# Patient Record
Sex: MALE | Race: WHITE | NOT HISPANIC OR LATINO | Employment: FULL TIME | URBAN - METROPOLITAN AREA
[De-identification: names, ages, dates, MRNs, and addresses within clinical notes are randomized per-mention and may not be internally consistent; named-entity substitution may affect disease eponyms.]

---

## 2021-01-26 ENCOUNTER — SOCIAL WORK (OUTPATIENT)
Dept: BEHAVIORAL/MENTAL HEALTH CLINIC | Facility: CLINIC | Age: 56
End: 2021-01-26
Payer: COMMERCIAL

## 2021-01-26 DIAGNOSIS — F41.1 GENERALIZED ANXIETY DISORDER: Primary | ICD-10-CM

## 2021-01-26 PROCEDURE — 90791 PSYCH DIAGNOSTIC EVALUATION: CPT
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
## 2021-01-26 NOTE — BH TREATMENT PLAN
Sol Weldon  1965       Date of Initial Treatment Plan: 1/26/2021   Date of Current Treatment Plan: 01/26/21    Treatment Plan Number 1     Strengths/Personal Resources for Self Care: honest and willing to help out when needed    Diagnosis:   1  Generalized anxiety disorder         Area of Needs: too trusting      Long Term Goal 1: Jeff improve self esteem too trusting and improve communication in marriage    Target Date: N/A  Completion Date: N/A         Short Term Objectives for Goal 1: Aidentify triggers that preceed an argument, Bto be more understanding of wifes past trauma and  remain aware of behaviors when interacting with others  Long Term Goal 2: N/A    Target Date: N/A  Completion Date: N/A    Short Term Objectives for Goal 2: N/A         Long Term Goal # 3: N/A     Target Date: N/A  Completion Date: N/A    Short Term Objectives for Goal 3: N/A    GOAL 1: Modality: 2 times a month completed by 6/26/2021    Behavioral Health Treatment Plan [facility]: Diagnosis and Treatment Plan explained to Howie Sanchez relates understanding diagnosis and is agreeable to Treatment Plan         Client Comments : Please share your thoughts, feelings, need and/or experiences regarding your treatment plan: ct agreed

## 2021-02-24 ENCOUNTER — SOCIAL WORK (OUTPATIENT)
Dept: BEHAVIORAL/MENTAL HEALTH CLINIC | Facility: CLINIC | Age: 56
End: 2021-02-24
Payer: COMMERCIAL

## 2021-02-24 DIAGNOSIS — F41.1 GENERALIZED ANXIETY DISORDER: Primary | ICD-10-CM

## 2021-02-24 PROCEDURE — 90834 PSYTX W PT 45 MINUTES: CPT
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
## 2021-03-17 ENCOUNTER — TELEMEDICINE (OUTPATIENT)
Dept: BEHAVIORAL/MENTAL HEALTH CLINIC | Facility: CLINIC | Age: 56
End: 2021-03-17
Payer: COMMERCIAL

## 2021-03-17 DIAGNOSIS — F41.1 GENERALIZED ANXIETY DISORDER: Primary | ICD-10-CM

## 2021-03-17 PROCEDURE — 90834 PSYTX W PT 45 MINUTES: CPT

## 2021-03-31 ENCOUNTER — SOCIAL WORK (OUTPATIENT)
Dept: BEHAVIORAL/MENTAL HEALTH CLINIC | Facility: CLINIC | Age: 56
End: 2021-03-31
Payer: COMMERCIAL

## 2021-03-31 DIAGNOSIS — F41.1 GENERALIZED ANXIETY DISORDER: Primary | ICD-10-CM

## 2021-03-31 PROCEDURE — 90834 PSYTX W PT 45 MINUTES: CPT

## 2021-03-31 NOTE — PSYCH
Psychotherapy Provided: Individual Psychotherapy 45 minutes     Length of time in session: 45 minutes, follow up in 2 week    Goals addressed in session: Goal 1     Pain:      none    0    Current suicide risk : Low     D- ct shared that he has had some good talks with his son and his son is still apprehensive about sharing with him what's is going on in its entirety  Ct reports that his communication with wife run hot and cold  Ct feels that she over reacts to and turns things around in arguments  Ct reports that his shift changed at work and he is still getting used to his new work schedule  Ct uses FMLA when needed  A- ct presented with mild anxiety  Ct is worried about son and ct was given information on counseling for son  Ct was verbal and engaged in session  Ct insight and judgement is fair  P- ct will attend session and use techniques to manage anxiety and improve communication  Behavioral Health Treatment Plan [facility]: Diagnosis and Treatment Plan explained to Ayo Rosales relates understanding diagnosis and is agreeable to Treatment Plan   Yes

## 2021-04-14 ENCOUNTER — SOCIAL WORK (OUTPATIENT)
Dept: BEHAVIORAL/MENTAL HEALTH CLINIC | Facility: CLINIC | Age: 56
End: 2021-04-14
Payer: COMMERCIAL

## 2021-04-14 DIAGNOSIS — F41.1 GENERALIZED ANXIETY DISORDER: Primary | ICD-10-CM

## 2021-04-14 PROCEDURE — 90834 PSYTX W PT 45 MINUTES: CPT
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
## 2021-04-28 ENCOUNTER — SOCIAL WORK (OUTPATIENT)
Dept: BEHAVIORAL/MENTAL HEALTH CLINIC | Facility: CLINIC | Age: 56
End: 2021-04-28
Payer: COMMERCIAL

## 2021-04-28 DIAGNOSIS — F41.1 GENERALIZED ANXIETY DISORDER: Primary | ICD-10-CM

## 2021-04-28 PROCEDURE — 90834 PSYTX W PT 45 MINUTES: CPT

## 2021-04-28 NOTE — PSYCH
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  He had a complaint filed against by a customer  Ct feels it has been resolved  Ct reports that he and wife are having ups and down and ct cannot predict when wife will start an argument  Ct and wife try to be intimate every nite and it appears that at times wife will start an argument  Ct will have open discussion with wife about expectations to make sure they are both on the same page  A- ct presented with mild anxiety  Ct is worried about son and wife  Ct was verbal and engaged in session  Ct insight and judgement is fair  P- ct will attend session and use techniques to manage anxiety and improve communication in marriage  Behavioral Health Treatment Plan [facility]: Diagnosis and Treatment Plan explained to Manda Moss relates understanding diagnosis and is agreeable to Treatment Plan   Yes

## 2021-05-12 ENCOUNTER — SOCIAL WORK (OUTPATIENT)
Dept: BEHAVIORAL/MENTAL HEALTH CLINIC | Facility: CLINIC | Age: 56
End: 2021-05-12
Payer: COMMERCIAL

## 2021-05-12 DIAGNOSIS — F41.1 GENERALIZED ANXIETY DISORDER: Primary | ICD-10-CM

## 2021-05-12 PROCEDURE — 90834 PSYTX W PT 45 MINUTES: CPT
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
## 2021-06-09 ENCOUNTER — SOCIAL WORK (OUTPATIENT)
Dept: BEHAVIORAL/MENTAL HEALTH CLINIC | Facility: CLINIC | Age: 56
End: 2021-06-09
Payer: COMMERCIAL

## 2021-06-09 DIAGNOSIS — F41.1 GENERALIZED ANXIETY DISORDER: Primary | ICD-10-CM

## 2021-06-09 PROCEDURE — 90834 PSYTX W PT 45 MINUTES: CPT

## 2021-06-09 NOTE — PSYCH
Psychotherapy Provided: Individual Psychotherapy 45 minutes     Length of time in session: 45 minutes, follow up in 2 week    Encounter Diagnosis     ICD-10-CM    1  Generalized anxiety disorder  F41 1        Goals addressed in session: Goal 1     Pain:      none    0    Current suicide risk : Low    This note was not shared with the patient due to this is a psychotherapy note    D- ct shared that he is stressed and anxious because his parents are visiting from [facility] [place] for 2-3 weeks and he realizes that his father is very needy and at times inappropriate  Ct reports that it has caused some issues with extended family and with his wife  Ct wife has history of bringing up the past with ct when she is upset  Ct reports that he is concerned with son  Ct son will be put on a list to see psychiatric nurse practioner  A- ct presented with mild to  moderate anxiety  Ct was verbal and engaged in session  Ct work stress is fluctuating  P- ct will attend session and use techniques to improve communication in marriage  Behavioral Health Treatment Plan [facility]: Diagnosis and Treatment Plan explained to Curly Dozier relates understanding diagnosis and is agreeable to Treatment Plan   Yes

## 2021-07-07 ENCOUNTER — SOCIAL WORK (OUTPATIENT)
Dept: BEHAVIORAL/MENTAL HEALTH CLINIC | Facility: CLINIC | Age: 56
End: 2021-07-07
Payer: COMMERCIAL

## 2021-07-07 DIAGNOSIS — F41.1 GENERALIZED ANXIETY DISORDER: Primary | ICD-10-CM

## 2021-07-07 PROCEDURE — 90834 PSYTX W PT 45 MINUTES: CPT

## 2021-07-07 NOTE — PSYCH
Psychotherapy Provided: Individual Psychotherapy 45 minutes     Length of time in session: 45 minutes, follow up in 2 week    Encounter Diagnosis     ICD-10-CM    1  Generalized anxiety disorder  F41 1        Goals addressed in session: Goal 1     Pain:      none    0    Current suicide risk : Low     D- ct shared that he and wife are generally doing alright  Ct feels that wife when drinking or angry brings up the past and is relentless  Ct reports that he has accepted his work conditions in regards to peer who can be harassing to him  Ct will just avoid this person  Ct is concerned that son has to wait for a medication evaluation  Until the end of August   Ct was given some options to pursue but probably will not because they are going away for one month to [place] and [place]  A- ct presented with mild anxiety  Ct was verbal and engaged in session  Ct sleep is normal   P- ct will attend sessions and use techniques to improve communication and manage anxiety  Behavioral Health Treatment Plan [facility]: Diagnosis and Treatment Plan explained to Arlette Fairbanks relates understanding diagnosis and is agreeable to Treatment Plan   Yes

## 2021-08-20 ENCOUNTER — SOCIAL WORK (OUTPATIENT)
Dept: BEHAVIORAL/MENTAL HEALTH CLINIC | Facility: CLINIC | Age: 56
End: 2021-08-20
Payer: COMMERCIAL

## 2021-08-20 DIAGNOSIS — F41.1 GENERALIZED ANXIETY DISORDER: Primary | ICD-10-CM

## 2021-08-20 PROCEDURE — 90834 PSYTX W PT 45 MINUTES: CPT

## 2021-08-20 NOTE — PSYCH
Psychotherapy Provided: Individual Psychotherapy 45 minutes     Length of time in session: 45 minutes, follow up in 2 week    Encounter Diagnosis     ICD-10-CM    1  Generalized anxiety disorder  F41 1        Goals addressed in session: Goal 1     Pain:      none    0    Current suicide risk : Low     D- ct shared that he and wife are still having difficulties where he feels she starts an argument and will either shut down or escalate  Ct feels that she projects her past and shortcoming onto him in regards to past infidelity and lies  Ct reports that they were on vacation in [place] and that wife gets very angry when they have to stop and ct feels she likes to control  Ct is please that son is doing better and is hopeful that son will have a good senior year  A- ct presented with moderate anxiety  Ct was verbal and engaged in session  Ct reports adequate sleep  P- ct will attend session, take med's as instructed, and use techniques to manage moods  Behavioral Health Treatment Plan [facility]: Diagnosis and Treatment Plan explained to Tameka Aquino relates understanding diagnosis and is agreeable to Treatment Plan   Yes

## 2021-09-03 ENCOUNTER — SOCIAL WORK (OUTPATIENT)
Dept: BEHAVIORAL/MENTAL HEALTH CLINIC | Facility: CLINIC | Age: 56
End: 2021-09-03
Payer: COMMERCIAL

## 2021-09-03 DIAGNOSIS — F41.1 GENERALIZED ANXIETY DISORDER: Primary | ICD-10-CM

## 2021-09-03 PROCEDURE — 90834 PSYTX W PT 45 MINUTES: CPT
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
## 2021-09-17 ENCOUNTER — SOCIAL WORK (OUTPATIENT)
Dept: BEHAVIORAL/MENTAL HEALTH CLINIC | Facility: CLINIC | Age: 56
End: 2021-09-17
Payer: COMMERCIAL

## 2021-09-17 DIAGNOSIS — F41.1 GENERALIZED ANXIETY DISORDER: Primary | ICD-10-CM

## 2021-09-17 PROCEDURE — 90834 PSYTX W PT 45 MINUTES: CPT

## 2021-09-17 NOTE — PSYCH
Psychotherapy Provided: Individual Psychotherapy 45 minutes     Length of time in session: 45 minutes, follow up in 2 week    Encounter Diagnosis     ICD-10-CM    1  Generalized anxiety disorder  F41 1        Goals addressed in session: Goal 1     Pain:      none    0    Current suicide risk : Low     D- ct shared that he had a stressful week  Ct mother had a mini stroke in [facility]  Ct flew down on Tuesday and stayed with mother who was discharged 2 days later  Ct is trying to convince parents to move near him and his family so he can support them  Ct parents are both in there [de-identified]  Ct reports that he and wife had some arguments but nothing major  Ct learned that wife stopped counseling  Ct also cleared up some issues with co worker  A- ct presented with moderate anxiety  Ct was verbal and engaged in session  Ct sleep has been adequate  P- ct will attend session and use techniques to manage anxiety and improve communication  Behavioral Health Treatment Plan [facility]: Diagnosis and Treatment Plan explained to Yolis Black relates understanding diagnosis and is agreeable to Treatment Plan   Yes

## 2021-10-01 ENCOUNTER — SOCIAL WORK (OUTPATIENT)
Dept: BEHAVIORAL/MENTAL HEALTH CLINIC | Facility: CLINIC | Age: 56
End: 2021-10-01
Payer: COMMERCIAL

## 2021-10-01 DIAGNOSIS — F41.1 GENERALIZED ANXIETY DISORDER: Primary | ICD-10-CM

## 2021-10-01 PROCEDURE — 90834 PSYTX W PT 45 MINUTES: CPT

## 2021-10-15 ENCOUNTER — SOCIAL WORK (OUTPATIENT)
Dept: BEHAVIORAL/MENTAL HEALTH CLINIC | Facility: CLINIC | Age: 56
End: 2021-10-15
Payer: COMMERCIAL

## 2021-10-15 DIAGNOSIS — F41.1 GENERALIZED ANXIETY DISORDER: Primary | ICD-10-CM

## 2021-10-15 PROCEDURE — 90834 PSYTX W PT 45 MINUTES: CPT

## 2021-10-29 ENCOUNTER — SOCIAL WORK (OUTPATIENT)
Dept: BEHAVIORAL/MENTAL HEALTH CLINIC | Facility: CLINIC | Age: 56
End: 2021-10-29
Payer: COMMERCIAL

## 2021-10-29 DIAGNOSIS — F41.1 GENERALIZED ANXIETY DISORDER: Primary | ICD-10-CM

## 2021-10-29 PROCEDURE — 90834 PSYTX W PT 45 MINUTES: CPT

## 2021-11-12 ENCOUNTER — SOCIAL WORK (OUTPATIENT)
Dept: BEHAVIORAL/MENTAL HEALTH CLINIC | Facility: CLINIC | Age: 56
End: 2021-11-12
Payer: COMMERCIAL

## 2021-11-12 DIAGNOSIS — F41.1 GENERALIZED ANXIETY DISORDER: Primary | ICD-10-CM

## 2021-11-12 PROCEDURE — 90834 PSYTX W PT 45 MINUTES: CPT

## 2021-12-10 ENCOUNTER — SOCIAL WORK (OUTPATIENT)
Dept: BEHAVIORAL/MENTAL HEALTH CLINIC | Facility: CLINIC | Age: 56
End: 2021-12-10
Payer: COMMERCIAL

## 2021-12-10 DIAGNOSIS — F41.1 GENERALIZED ANXIETY DISORDER: Primary | ICD-10-CM

## 2021-12-10 PROCEDURE — 90834 PSYTX W PT 45 MINUTES: CPT

## 2022-01-07 ENCOUNTER — SOCIAL WORK (OUTPATIENT)
Dept: BEHAVIORAL/MENTAL HEALTH CLINIC | Facility: CLINIC | Age: 57
End: 2022-01-07
Payer: COMMERCIAL

## 2022-01-07 DIAGNOSIS — F41.1 GENERALIZED ANXIETY DISORDER: Primary | ICD-10-CM

## 2022-01-07 PROCEDURE — 90834 PSYTX W PT 45 MINUTES: CPT

## 2022-01-07 NOTE — PSYCH
Psychotherapy Provided: Individual Psychotherapy 45 minutes     Length of time in session: 45 minutes, follow up in 2 week    Encounter Diagnosis     ICD-10-CM    1  Generalized anxiety disorder  F41 1        Goals addressed in session: Goal 1     Pain:      none    0    Current suicide risk : Low     D- ct shared that he had a very difficult Ruth season because his parents were visiting  Ct reports that father is inappropriate and tells and says things lewdly at times  This made ct wife uncomfortable  Ct had to take days off from work to be home to mitigate issues before they escalated  Ct reports that wife still brings up the past   Jeremy Vee is still considering divorce but feels that things are worth working on for now  Ct son is doing a little better in regards to depression and is being more creative  A- ct presented with moderate anxiety  Ct was verbal and engaged in session  Ct feels like he is on a roller coaster at times with wife  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

## 2022-01-21 ENCOUNTER — SOCIAL WORK (OUTPATIENT)
Dept: BEHAVIORAL/MENTAL HEALTH CLINIC | Facility: CLINIC | Age: 57
End: 2022-01-21
Payer: COMMERCIAL

## 2022-01-21 DIAGNOSIS — F41.1 GENERALIZED ANXIETY DISORDER: Primary | ICD-10-CM

## 2022-01-21 PROCEDURE — 90834 PSYTX W PT 45 MINUTES: CPT
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
## 2022-02-04 ENCOUNTER — SOCIAL WORK (OUTPATIENT)
Dept: BEHAVIORAL/MENTAL HEALTH CLINIC | Facility: CLINIC | Age: 57
End: 2022-02-04
Payer: COMMERCIAL

## 2022-02-04 DIAGNOSIS — F41.1 GENERALIZED ANXIETY DISORDER: Primary | ICD-10-CM

## 2022-02-04 PROCEDURE — 90834 PSYTX W PT 45 MINUTES: CPT

## 2022-02-04 NOTE — PSYCH
Psychotherapy Provided: Individual Psychotherapy 45 minutes     Length of time in session: 45 minutes, follow up in 2 week    Encounter Diagnosis     ICD-10-CM    1  Generalized anxiety disorder  F41 1        Goals addressed in session: Goal 1     Pain:      none    0    Current suicide risk : Low     D- ct shared that he and wife had an interesting conversation in which ct criticized wife  Wife came back and asked him to stop because she said that she is trying hard to say things about the past   Ct was quick to realize that he made a mistake and that maybe some of his actions/words may trigger wife  Ct reports that there have been no further issues at work  Ct has concerns for fathers health  A- ct presented with mild anxiety  Ct was verbal and engaged in session  Ct sleep is adequate most nights  P- ct will attend session, take med's as instructed, and use techniques to manage moods and improve communication  Behavioral Health Treatment Plan [facility]: Diagnosis and Treatment Plan explained to James Charles relates understanding diagnosis and is agreeable to Treatment Plan   Yes

## 2022-03-08 ENCOUNTER — SOCIAL WORK (OUTPATIENT)
Dept: BEHAVIORAL/MENTAL HEALTH CLINIC | Facility: CLINIC | Age: 57
End: 2022-03-08
Payer: COMMERCIAL

## 2022-03-08 DIAGNOSIS — F41.1 GENERALIZED ANXIETY DISORDER: Primary | ICD-10-CM

## 2022-03-08 PROCEDURE — 90834 PSYTX W PT 45 MINUTES: CPT

## 2022-03-10 NOTE — PSYCH
Psychotherapy Provided: Individual Psychotherapy 45 minutes     Length of time in session: 45 minutes, follow up in 2 week    Encounter Diagnosis     ICD-10-CM    1  Generalized anxiety disorder  F41 1        Goals addressed in session: Goal 1     Pain:      none    0    Current suicide risk : Low     D- ct shared that he and wife are again having the same issues in which she brings up the past   Ct reports that it is worse when she drinks  Ct is back on the fence thinking about divorce  Ct is stressed because his father is not well and there is not much he can do about this  Ct reports mo issues at work at this time  Ct son is doing well  A- ct presented with moderate level of anxiety  Ct was verbal and engaged in session  Ct sleep is adequate  P- ct will attend session and nicky techniques commonage anxiety and improve communication  Behavioral Health Treatment Plan [facility]: Diagnosis and Treatment Plan explained to Loretta Clifton relates understanding diagnosis and is agreeable to Treatment Plan   Yes

## 2022-03-18 ENCOUNTER — SOCIAL WORK (OUTPATIENT)
Dept: BEHAVIORAL/MENTAL HEALTH CLINIC | Facility: CLINIC | Age: 57
End: 2022-03-18
Payer: COMMERCIAL

## 2022-03-18 DIAGNOSIS — F41.1 GENERALIZED ANXIETY DISORDER: Primary | ICD-10-CM

## 2022-03-18 PROCEDURE — 90834 PSYTX W PT 45 MINUTES: CPT

## 2022-03-22 NOTE — PSYCH
Psychotherapy Provided: Individual Psychotherapy 45 minutes     Length of time in session: 45 minutes, follow up in 2 week    Encounter Diagnosis     ICD-10-CM    1  Generalized anxiety disorder  F41 1        Goals addressed in session: Goal 1     Pain:      none    0    Current suicide risk : Low     D-ct shared that he and wife are still experiencing the same issues in regards to there communication  Ct reports that he is able to de escalate it by ;leaving the situation  Ct reports that he got a new route at work and that it is less stressful  Ct reports that son is stable at this time  Ct is stressed by parents in [facility]  Ct father is not well  A- ct presented with moderate level of anxiety in regard to marriage and parents  Ct was verbal and engaged in session  Ct sleep is adequate  P- ct will attend session and use techniques to manage moods  Behavioral Health Treatment Plan [facility]: Diagnosis and Treatment Plan explained to Yang Gonzalez relates understanding diagnosis and is agreeable to Treatment Plan   Yes

## 2022-04-14 ENCOUNTER — SOCIAL WORK (OUTPATIENT)
Dept: BEHAVIORAL/MENTAL HEALTH CLINIC | Facility: CLINIC | Age: 57
End: 2022-04-14
Payer: COMMERCIAL

## 2022-04-14 DIAGNOSIS — F41.1 GENERALIZED ANXIETY DISORDER: Primary | ICD-10-CM

## 2022-04-14 PROCEDURE — 90834 PSYTX W PT 45 MINUTES: CPT

## 2022-04-14 NOTE — PSYCH
Psychotherapy Provided: Individual Psychotherapy 45 minutes     Length of time in session: 45 minutes, follow up in 2 week    Encounter Diagnosis     ICD-10-CM    1  Generalized anxiety disorder  F41 1        Goals addressed in session: Goal 1     Pain:      none    0    Current suicide risk : Low     D- ct shared that he and wife are having a difficult time  Ct feels that his best move is to divorce  However ct will not do that until after son graduates in 2 months  Ct feels that wife has not changed and is unwilling to at this point  Ct feels that her drinking is causing more problems because she becomes unreasonable and brings up the past     A- ct presented with moderate anxiety  Ct was verbal and engaged in session  Ct work is stressful due to erratic hours  P- ct will attend session, take med's as instructed, and use techniques to manage moods  Behavioral Health Treatment Plan [facility]: Diagnosis and Treatment Plan explained to Peterson [place] relates understanding diagnosis and is agreeable to Treatment Plan   Yes

## 2022-04-29 ENCOUNTER — SOCIAL WORK (OUTPATIENT)
Dept: BEHAVIORAL/MENTAL HEALTH CLINIC | Facility: CLINIC | Age: 57
End: 2022-04-29
Payer: COMMERCIAL

## 2022-04-29 DIAGNOSIS — F41.1 GENERALIZED ANXIETY DISORDER: Primary | ICD-10-CM

## 2022-04-29 PROCEDURE — 90834 PSYTX W PT 45 MINUTES: CPT

## 2022-04-29 NOTE — BH TREATMENT PLAN
Zuleyka Elaine  1965       Date of Initial Treatment Plan: 1/26/2021   Date of Current Treatment Plan: 04/29/22    Treatment Plan Number 2     Strengths/Personal Resources for Self Care: honesty and concern for others    Diagnosis:   1  Generalized anxiety disorder         Area of Needs: maritial communication      Long Term Goal 1: Act will continue to work on communication with wife  ct also wants to get into better shape  Target Date: N/A  Completion Date: N/A         Short Term Objectives for Goal 1: Act will to a better job listening by not providing feedback and defending his position  , Bct will tweak and improve lifting program as he egts older  ct will also introduce cardio and Cct will work with wife to help ct parents  Long Term Goal 2: N/A    Target Date: N/A  Completion Date: N/A    Short Term Objectives for Goal 2: N/A         Long Term Goal # 3: N/A     Target Date: N/A  Completion Date: N/A    Short Term Objectives for Goal 3: N/A    GOAL 1: Modality: Individual 2x per month   Completion Date 9/29/2022      Behavioral Health Treatment Plan [facility]: Diagnosis and Treatment Plan explained to Iraj Culver relates understanding diagnosis and is agreeable to Treatment Plan         Client Comments : Please share your thoughts, feelings, need and/or experiences regarding your treatment plan: ct agreed

## 2022-04-29 NOTE — PSYCH
Psychotherapy Provided: Individual Psychotherapy 45 minutes     Length of time in session: 45 minutes, follow up in 2 week    Encounter Diagnosis     ICD-10-CM    1  Generalized anxiety disorder  F41 1        Goals addressed in session: Goal 1     Pain:      none    0    Current suicide risk : Low     D- ct shared that he and wife had a good couple of weeks  Ct wife spoke with ct and opened up  Ct listened and did not defend himself  Ct did ask wife if she remembered certain things which she did not due to her drinking  Ct is stressed and anxious regarding parents  Ct father is in poor health  Ct mother is struggling to care for him  Ct son is doing well  A- ct presented with moderate level of anxiety  Ct was verbal and engaged in session  Ct sleep is adequate  P- ct will attend session, take med's as instructed, and use techniques to manage moods and improve communication  Behavioral Health Treatment Plan [employer]: Diagnosis and Treatment Plan explained to Juanito Ennis relates understanding diagnosis and is agreeable to Treatment Plan   Yes

## 2022-05-13 ENCOUNTER — SOCIAL WORK (OUTPATIENT)
Dept: BEHAVIORAL/MENTAL HEALTH CLINIC | Facility: CLINIC | Age: 57
End: 2022-05-13
Payer: COMMERCIAL

## 2022-05-13 DIAGNOSIS — F41.1 GENERALIZED ANXIETY DISORDER: Primary | ICD-10-CM

## 2022-05-13 PROCEDURE — 90834 PSYTX W PT 45 MINUTES: CPT

## 2022-05-27 ENCOUNTER — SOCIAL WORK (OUTPATIENT)
Dept: BEHAVIORAL/MENTAL HEALTH CLINIC | Facility: CLINIC | Age: 57
End: 2022-05-27
Payer: COMMERCIAL

## 2022-05-27 DIAGNOSIS — F41.1 GENERALIZED ANXIETY DISORDER: Primary | ICD-10-CM

## 2022-05-27 PROCEDURE — 90834 PSYTX W PT 45 MINUTES: CPT
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
## 2022-06-10 ENCOUNTER — SOCIAL WORK (OUTPATIENT)
Dept: BEHAVIORAL/MENTAL HEALTH CLINIC | Facility: CLINIC | Age: 57
End: 2022-06-10
Payer: COMMERCIAL

## 2022-06-10 DIAGNOSIS — F41.1 GENERALIZED ANXIETY DISORDER: Primary | ICD-10-CM

## 2022-06-10 PROCEDURE — 90834 PSYTX W PT 45 MINUTES: CPT
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
## 2022-06-24 ENCOUNTER — SOCIAL WORK (OUTPATIENT)
Dept: BEHAVIORAL/MENTAL HEALTH CLINIC | Facility: CLINIC | Age: 57
End: 2022-06-24
Payer: COMMERCIAL

## 2022-06-24 DIAGNOSIS — F41.1 GENERALIZED ANXIETY DISORDER: Primary | ICD-10-CM

## 2022-06-24 PROCEDURE — 90834 PSYTX W PT 45 MINUTES: CPT
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
## 2022-07-08 ENCOUNTER — SOCIAL WORK (OUTPATIENT)
Dept: BEHAVIORAL/MENTAL HEALTH CLINIC | Facility: CLINIC | Age: 57
End: 2022-07-08
Payer: COMMERCIAL

## 2022-07-08 DIAGNOSIS — F41.1 GENERALIZED ANXIETY DISORDER: Primary | ICD-10-CM

## 2022-07-08 PROCEDURE — 90834 PSYTX W PT 45 MINUTES: CPT

## 2022-07-08 NOTE — PSYCH
Psychotherapy Provided: Individual Psychotherapy 45 minutes     Length of time in session: 45 minutes, follow up in 2 week    Encounter Diagnosis     ICD-10-CM    1  Generalized anxiety disorder  F41 1        Goals addressed in session: Goal 1     Pain:      none    0    Current suicide risk : Low     D-ct shared that he and wife did not have a meaningful conversation for almost a week after our last session  Ct and wife finally did talk  Ct asked wife to listen to recording of her screaming at ct and what she was screaming about  Ct shared that it is all stuff in the past   Ct feels he has worked on resolving the past where ct feels she has not  Ct will go to see parents in [address] [place] [address] in 2 weeks  Ct needs to assess the situation and fathers health which is not good  A-ct presented with mild anxiety  Ct was verbal and engaged in session  Ct sleep is adequate  P- ct will attend session, and use techniques to manage moods  Behavioral Health Treatment Plan [facility]: Diagnosis and Treatment Plan explained to Jon Waldron relates understanding diagnosis and is agreeable to Treatment Plan   Yes

## 2022-08-05 ENCOUNTER — SOCIAL WORK (OUTPATIENT)
Dept: BEHAVIORAL/MENTAL HEALTH CLINIC | Facility: CLINIC | Age: 57
End: 2022-08-05
Payer: COMMERCIAL

## 2022-08-05 DIAGNOSIS — F41.1 GENERALIZED ANXIETY DISORDER: Primary | ICD-10-CM

## 2022-08-05 PROCEDURE — 90834 PSYTX W PT 45 MINUTES: CPT

## 2022-08-05 NOTE — PSYCH
Psychotherapy Provided: Individual Psychotherapy 45 minutes     Length of time in session: 45 minutes, follow up in 2 week    Encounter Diagnosis     ICD-10-CM    1  Generalized anxiety disorder  F41 1        Goals addressed in session: Goal 1     Pain:      none    0    Current suicide risk : Low     D- ct shared that he and wife went to [place] to see ct parents  Ct father is ill and not doing well  However he was doing better then ct thought  Ct and wife had one argument about the past and ct will address an issue with wife about someone from there past that ct does not want to see  Ct son will be going to college in 2 weeks  Ct shared that a friend is coming to visit this weekend  Ct denies issues at work  A ct presented with moderate level of anxiety  Ct was verbal and engaged in session  Ct sleep is adequate  P- ct will attend session, take med's as instructed, and use techniques to manage moods  Behavioral Health Treatment Plan [facility]: Diagnosis and Treatment Plan explained to James Charles relates understanding diagnosis and is agreeable to Treatment Plan   Yes

## 2022-08-26 ENCOUNTER — SOCIAL WORK (OUTPATIENT)
Dept: BEHAVIORAL/MENTAL HEALTH CLINIC | Facility: CLINIC | Age: 57
End: 2022-08-26
Payer: COMMERCIAL

## 2022-08-26 DIAGNOSIS — F41.1 GENERALIZED ANXIETY DISORDER: Primary | ICD-10-CM

## 2022-08-26 PROCEDURE — 90834 PSYTX W PT 45 MINUTES: CPT

## 2022-09-15 ENCOUNTER — TELEPHONE (OUTPATIENT)
Dept: PSYCHIATRY | Facility: CLINIC | Age: 57
End: 2022-09-15

## 2022-09-15 NOTE — TELEPHONE ENCOUNTER
Patient called to cancel his 9/16 apt with Malissa Lesches   Patient will see Malissa Lesches on his next sched apt 9/30

## 2022-09-30 ENCOUNTER — SOCIAL WORK (OUTPATIENT)
Dept: BEHAVIORAL/MENTAL HEALTH CLINIC | Facility: CLINIC | Age: 57
End: 2022-09-30
Payer: COMMERCIAL

## 2022-09-30 DIAGNOSIS — F41.1 GENERALIZED ANXIETY DISORDER: Primary | ICD-10-CM

## 2022-09-30 PROCEDURE — 90834 PSYTX W PT 45 MINUTES: CPT
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
## 2022-10-14 ENCOUNTER — SOCIAL WORK (OUTPATIENT)
Dept: BEHAVIORAL/MENTAL HEALTH CLINIC | Facility: CLINIC | Age: 57
End: 2022-10-14
Payer: COMMERCIAL

## 2022-10-14 DIAGNOSIS — F41.1 GENERALIZED ANXIETY DISORDER: Primary | ICD-10-CM

## 2022-10-14 PROCEDURE — 90834 PSYTX W PT 45 MINUTES: CPT

## 2022-10-14 NOTE — PSYCH
Visit Time    Visit Start Time: 206 PM  Visit Stop Time: 251 PM  Total Visit Duration: 45 minutesPsychotherapy Provided: Individual Psychotherapy 45 minutes     Length of time in session: 45 minutes, follow up in 2 week    Encounter Diagnosis     ICD-10-CM    1  Generalized anxiety disorder  F41 1        Goals addressed in session: Goal 1     Pain:      none    0    Current suicide risk : Low     D- ct shared that he and wife are still having there issues  Ct wife will drink and will then bring up the past   Most of the time ct goes and sleeps in another room  Ct is not sure how to make wife see that this behavior is damaging  Ct is concerned about his parents in [place]  Ct mother reports that fathers health is worse  Ct son is doing well at college  Ct reports that work has been steady and stable  A- ct presented with moderate level of anxiety regarding family issues  Ct was verbal and engaged in session  Ct sleep is adequate when he and wife are not having issues  P_ ct will attend session and use techniques to manage anxiety  Behavioral Health Treatment Plan [facility]: Diagnosis and Treatment Plan explained to Lolita Carr relates understanding diagnosis and is agreeable to Treatment Plan   Yes

## 2022-10-14 NOTE — BH TREATMENT PLAN
Mary Keller  1965       Date of Initial Treatment Plan: 1/26/2021   Date of Current Treatment Plan: 10/14/22    Treatment Plan Number 3     Strengths/Personal Resources for Self Care: hard working    Diagnosis:   1  Generalized anxiety disorder         Area of Needs: marriage      Long Term Goal 1: Act will work on marriage, managing parents health, and managing his health    Target Date: N/A  Completion Date: N/A         Short Term Objectives for Goal 1: Act will be patient and communicate with wife daily, Bct will contact parents 5 times a week  and Cct will keep docotr appointments and make changes    Long Term Goal 2: N/A    Target Date: N/A  Completion Date: N/A    Short Term Objectives for Goal 2: N/A         Long Term Goal # 3: N/A     Target Date: N/A  Completion Date: N/A    Short Term Objectives for Goal 3: N/A    GOAL 1: Modality: Individual 2x per month   Completion Date 3/14/2023    Behavioral Health Treatment Plan [facility]: Diagnosis and Treatment Plan explained to Jie Vides relates understanding diagnosis and is agreeable to Treatment Plan  Client Comments : Please share your thoughts, feelings, need and/or experiences regarding your treatment plan: ct agreed

## 2022-10-28 ENCOUNTER — SOCIAL WORK (OUTPATIENT)
Dept: BEHAVIORAL/MENTAL HEALTH CLINIC | Facility: CLINIC | Age: 57
End: 2022-10-28

## 2022-10-28 DIAGNOSIS — F41.1 GENERALIZED ANXIETY DISORDER: Primary | ICD-10-CM

## 2022-10-28 NOTE — PSYCH
Psychotherapy Provided: Individual Psychotherapy 45 minutes     Length of time in session: 45 minutes, follow up in 2 week    Encounter Diagnosis     ICD-10-CM    1  Generalized anxiety disorder  F41 1        Goals addressed in session: Goal 1     Pain:      none    0    Current suicide risk : Low     D- ct shared that his wife did have too much to drink and brought up the past   Ct handled it by being sarcastic  It did not escalate the situation  Ct reports that he had a birthday party with wife and it went well  Ct did curtail how much she drank  Ct reports that he has been going to work and there have been know issues  Ct reports that he met someone who races cars and ct was going to ride with him on a track but it got rained out  A- ct presented with mild anxiety  Ct was verbal and engaged in session  Ct sleep is adequate  P- ct will attend session, take med's as instructed, and use techniques to manage moods  Behavioral Health Treatment Plan [facility]: Diagnosis and Treatment Plan explained to Nena cristobal understanding diagnosis and is agreeable to Treatment Plan   YesVisit Time    Visit Start Time: 201 pm  Visit Stop Time: 246 pm  Total Visit Duration: 45 minutes

## 2022-12-23 ENCOUNTER — TELEPHONE (OUTPATIENT)
Dept: PSYCHIATRY | Facility: CLINIC | Age: 57
End: 2022-12-23

## 2023-02-06 ENCOUNTER — TELEPHONE (OUTPATIENT)
Dept: PSYCHIATRY | Facility: CLINIC | Age: 58
End: 2023-02-06

## 2023-02-06 NOTE — TELEPHONE ENCOUNTER
Patient experiencing high level anxiety because of current problems at work  Patient would like an appointment as soon as possible but Mayank Pearson does not have an open appointment time until 2/22/2023  Patient stated that it was too far out  I stated that I would put him on Claudio's wait list and pass the information to Oleksandr  Patient is available all day tomorrow 2/7/2023 if there should be any cancelled appointments  Patient can be reached at [phone]

## 2023-02-07 ENCOUNTER — SOCIAL WORK (OUTPATIENT)
Dept: BEHAVIORAL/MENTAL HEALTH CLINIC | Facility: CLINIC | Age: 58
End: 2023-02-07

## 2023-02-07 DIAGNOSIS — F41.1 GENERALIZED ANXIETY DISORDER: Primary | ICD-10-CM
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
## 2023-02-16 ENCOUNTER — TELEPHONE (OUTPATIENT)
Dept: PSYCHIATRY | Facility: CLINIC | Age: 58
End: 2023-02-16

## 2023-02-17 ENCOUNTER — SOCIAL WORK (OUTPATIENT)
Dept: BEHAVIORAL/MENTAL HEALTH CLINIC | Facility: CLINIC | Age: 58
End: 2023-02-17

## 2023-02-17 DIAGNOSIS — F41.1 GENERALIZED ANXIETY DISORDER: Primary | ICD-10-CM

## 2023-02-17 NOTE — PSYCH
Behavioral Health Psychotherapy Progress Note    Psychotherapy Provided: Individual Psychotherapy     1  Generalized anxiety disorder            Goals addressed in session: Goal 1     DATA: ct shared that he is off from work until March 6, 2023 due to work stress  Ct reports that he has filed ethic complaints against supervisor because of his derogatory statement about ct  Ct shared that he and wife are communicating and that it is good most days  Ct is also concerned about his mother who is moving back to the area after his fathers passing late last year  During this session, this clinician used the following therapeutic modalities: Cognitive Behavioral Therapy and Cognitive Processing Therapy    Substance Abuse was not addressed during this session  If the client is diagnosed with a co-occurring substance use disorder, please indicate any changes in the frequency or amount of use: na  Stage of change for addressing substance use diagnoses: No substance use/Not applicable    ASSESSMENT:  Evy Turcios presents with a Anxious mood  Ct was verbal and engaged in session     his affect is Normal range and intensity, which is congruent, with his mood and the content of the session  The client has made progress on their goals  No SI/HI Evy Turcios presents with a none risk of suicide, none risk of self-harm, and none risk of harm to others  For any risk assessment that surpasses a "low" rating, a safety plan must be developed  A safety plan was indicated: no  If yes, describe in detail na    PLAN: Between sessions, Eyv Turcios will manage anxiety  At the next session, the therapist will use Cognitive Behavioral Therapy and Cognitive Processing Therapy to address anxiety  Behavioral Health Treatment Plan and Discharge Planning: Evy Turcios is aware of and agrees to continue to work on their treatment plan  They have identified and are working toward their discharge goals   yes    Visit start and stop times:    02/17/23  Start Time: 1200  Stop Time: 1245  Total Visit Time: 45 minutes

## 2023-02-28 ENCOUNTER — SOCIAL WORK (OUTPATIENT)
Dept: BEHAVIORAL/MENTAL HEALTH CLINIC | Facility: CLINIC | Age: 58
End: 2023-02-28

## 2023-02-28 DIAGNOSIS — F41.1 GENERALIZED ANXIETY DISORDER: Primary | ICD-10-CM
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
## 2023-03-17 ENCOUNTER — SOCIAL WORK (OUTPATIENT)
Dept: BEHAVIORAL/MENTAL HEALTH CLINIC | Facility: CLINIC | Age: 58
End: 2023-03-17

## 2023-03-17 DIAGNOSIS — F41.1 GENERALIZED ANXIETY DISORDER: Primary | ICD-10-CM

## 2023-03-17 NOTE — PSYCH
Behavioral Health Psychotherapy Progress Note    Psychotherapy Provided: Individual Psychotherapy     1  Generalized anxiety disorder            Goals addressed in session: Goal 1     DATA: ct shared that he has been back to work and he has avoided triggers  Ct reports that he and wife are getting along a little better but this is there dynamic  Ct is stressed about work because of the past incidents he has had  Ct is finding that playing his guitar again is good at reducing stress  During this session, this clinician used the following therapeutic modalities: Cognitive Behavioral Therapy and Cognitive Processing Therapy    Substance Abuse was not addressed during this session  If the client is diagnosed with a co-occurring substance use disorder, please indicate any changes in the frequency or amount of use: na  Stage of change for addressing substance use diagnoses: No substance use/Not applicable    ASSESSMENT:  Evy Turcios presents with a Euthymic/ normal and Anxious mood  his affect is Normal range and intensity, which is congruent, with his mood and the content of the session  The client has made progress on their goals  NO SI/HI Evy Turcios presents with a none risk of suicide, none risk of self-harm, and none risk of harm to others  For any risk assessment that surpasses a "low" rating, a safety plan must be developed  A safety plan was indicated: no  If yes, describe in detail na    PLAN: Between sessions, Evy Turcios will manage anxiety at home/work  At the next session, the therapist will use Cognitive Behavioral Therapy and Cognitive Processing Therapy to address anxiety  Behavioral Health Treatment Plan and Discharge Planning: Evy Turcios is aware of and agrees to continue to work on their treatment plan  They have identified and are working toward their discharge goals   yes    Visit start and stop times:    03/17/23  Start Time: 1000  Stop Time: 1045  Total Visit Time: 45 minutes

## 2023-03-17 NOTE — BH TREATMENT PLAN
Outpatient Behavioral Health Psychotherapy Treatment Plan    Gerson Petit  1965     Date of Initial Psychotherapy Assessment: 1/26/2021   Date of Current Treatment Plan: 03/17/23  Treatment Plan Target Date: 9/17/2023  Treatment Plan Expiration Date: 9/17/2023    Diagnosis:   1  Generalized anxiety disorder            Area(s) of Need: anxiety    Long Term Goal 1 (in the client's own words): I want to be more centered at work and home    Stage of Change: Action    Target Date for completion: 9/17/2023     Anticipated therapeutic modalities: CBT     People identified to complete this goal: client and counselor      Objective 1: (identify the means of measuring success in meeting the objective): ct will manage negative thoughts in regards to work to avoid issues with management      Objective 2: (identify the means of measuring success in meeting the objective): ct will use I statements in communicating at work and at home  I am currently under the care of a [facility] psychiatric provider: no    My [facility] psychiatric provider is: na    I am currently taking psychiatric medications: na    I feel that I will be ready for discharge from mental health care when I reach the following (measurable goal/objective): When I am better and not stressed about going to work    For children and adults who have a legal guardian:   Has there been any change to custody orders and/or guardianship status? No  If yes, attach updated documentation  I have not created my Crisis Plan and have been offered a copy of this plan    [address]: Diagnosis and Treatment Plan explained to [address] [place] [address] acknowledges an understanding of their diagnosis  Gerson Petit agrees to this treatment plan      I have been offered a copy of this Treatment Plan  yes

## 2023-04-27 ENCOUNTER — SOCIAL WORK (OUTPATIENT)
Dept: BEHAVIORAL/MENTAL HEALTH CLINIC | Facility: CLINIC | Age: 58
End: 2023-04-27

## 2023-04-27 DIAGNOSIS — F41.1 GENERALIZED ANXIETY DISORDER: Primary | ICD-10-CM

## 2023-04-27 NOTE — PSYCH
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 leaving it a mess  Ct explained as a courtesy to other drivers you clean up when your done with your shift  Ct made mention of this to dispatch who failed to do anything  Ct talked to the person directly and it went well  However the behavior did not change  Ct talked to the individual again and ct walked away and used a curse word  Ct co worker filed a police reports claiming to be threatened thus ct suspension  Ct feels that management is out to fire him and our purposely doing things to make ct have an outburst or make a mistake  During this session, this clinician used the following therapeutic modalities: Cognitive Behavioral Therapy and Cognitive Processing Therapy    Substance Abuse was not addressed during this session  If the client is diagnosed with a co-occurring substance use disorder, please indicate any changes in the frequency or amount of use: na  Stage of change for addressing substance use diagnoses: No substance use/Not applicable    ASSESSMENT:  Silvana Chen presents with a Anxious and Depressed mood  Ct has been having trouble sleeping and is worried about losing his job  his affect is Flat, which is congruent, with his mood and the content of the session  The client has made progress on their goals  No SI/HI Silvana Chen presents with a none risk of suicide, none risk of self-harm, and none risk of harm to others  For any risk assessment that surpasses a \"low\" rating, a safety plan must be developed  A safety plan was indicated: no  If yes, describe in detail na    PLAN: Between sessions, Silvana Chen will manage anxiety   At the " next session, the therapist will use Cognitive Behavioral Therapy and Cognitive Processing Therapy to address anxiety at work  Behavioral Health Treatment Plan and Discharge Planning: Fernando Calloway is aware of and agrees to continue to work on their treatment plan  They have identified and are working toward their discharge goals   yes    Visit start and stop times:    04/27/23  Start Time: 0902  Stop Time: 9918  Total Visit Time: 50 minutes

## 2023-08-04 ENCOUNTER — DOCUMENTATION (OUTPATIENT)
Dept: BEHAVIORAL/MENTAL HEALTH CLINIC | Facility: CLINIC | Age: 58
End: 2023-08-04

## 2023-08-04 NOTE — PROGRESS NOTES
Psychotherapy Discharge Summary    Preferred Name: Steph Sky  YOB: 1965    Admission date to psychotherapy: 1/26/2021    Referred by: self    Presenting Problem: anxiety    Course of treatment included : individual therapy     Progress/Outcome of Treatment Goals (brief summary of course of treatment) ct attended sessions and achieved goals    Treatment Complications (if any): none    Treatment Progress: good    Current [facility] Psychiatric Provider: na    Discharge Medications include:  See chart    Discharge Date: 8/4/2023    Discharge Diagnosis: No diagnosis found.     Criteria for Discharge: completed treatment goals and objectives and is no longer in need of services    Aftercare recommendations include (include specific referral names and phone numbers, if appropriate): none    Prognosis: good